# Patient Record
Sex: FEMALE | Race: OTHER | NOT HISPANIC OR LATINO | URBAN - METROPOLITAN AREA
[De-identification: names, ages, dates, MRNs, and addresses within clinical notes are randomized per-mention and may not be internally consistent; named-entity substitution may affect disease eponyms.]

---

## 2020-12-11 ENCOUNTER — INPATIENT (INPATIENT)
Facility: HOSPITAL | Age: 40
LOS: 0 days | Discharge: HOME | DRG: 833 | End: 2020-12-12
Attending: STUDENT IN AN ORGANIZED HEALTH CARE EDUCATION/TRAINING PROGRAM | Admitting: STUDENT IN AN ORGANIZED HEALTH CARE EDUCATION/TRAINING PROGRAM
Payer: MEDICAID

## 2020-12-11 ENCOUNTER — OUTPATIENT (OUTPATIENT)
Dept: OUTPATIENT SERVICES | Facility: HOSPITAL | Age: 40
LOS: 1 days | End: 2020-12-11
Payer: SELF-PAY

## 2020-12-11 VITALS
HEART RATE: 87 BPM | SYSTOLIC BLOOD PRESSURE: 124 MMHG | RESPIRATION RATE: 16 BRPM | WEIGHT: 164.91 LBS | TEMPERATURE: 98 F | DIASTOLIC BLOOD PRESSURE: 64 MMHG | OXYGEN SATURATION: 98 %

## 2020-12-11 DIAGNOSIS — F14.99 COCAINE USE, UNSPECIFIED WITH UNSPECIFIED COCAINE-INDUCED DISORDER: ICD-10-CM

## 2020-12-11 DIAGNOSIS — Z3A.27 27 WEEKS GESTATION OF PREGNANCY: ICD-10-CM

## 2020-12-11 DIAGNOSIS — Z59.0 HOMELESSNESS: ICD-10-CM

## 2020-12-11 DIAGNOSIS — R07.89 OTHER CHEST PAIN: ICD-10-CM

## 2020-12-11 DIAGNOSIS — O99.282 ENDOCRINE, NUTRITIONAL AND METABOLIC DISEASES COMPLICATING PREGNANCY, SECOND TRIMESTER: ICD-10-CM

## 2020-12-11 DIAGNOSIS — O99.322 DRUG USE COMPLICATING PREGNANCY, SECOND TRIMESTER: ICD-10-CM

## 2020-12-11 DIAGNOSIS — E88.09 OTHER DISORDERS OF PLASMA-PROTEIN METABOLISM, NOT ELSEWHERE CLASSIFIED: ICD-10-CM

## 2020-12-11 DIAGNOSIS — Z3A.00 WEEKS OF GESTATION OF PREGNANCY NOT SPECIFIED: ICD-10-CM

## 2020-12-11 DIAGNOSIS — O26.899 OTHER SPECIFIED PREGNANCY RELATED CONDITIONS, UNSPECIFIED TRIMESTER: ICD-10-CM

## 2020-12-11 DIAGNOSIS — O99.891 OTHER SPECIFIED DISEASES AND CONDITIONS COMPLICATING PREGNANCY: ICD-10-CM

## 2020-12-11 DIAGNOSIS — R07.9 CHEST PAIN, UNSPECIFIED: ICD-10-CM

## 2020-12-11 LAB
ALBUMIN SERPL ELPH-MCNC: 2.2 G/DL — LOW (ref 3.5–5)
ALP SERPL-CCNC: 109 U/L — SIGNIFICANT CHANGE UP (ref 40–120)
ALT FLD-CCNC: 36 U/L DA — SIGNIFICANT CHANGE UP (ref 10–60)
ANION GAP SERPL CALC-SCNC: 8 MMOL/L — SIGNIFICANT CHANGE UP (ref 5–17)
AST SERPL-CCNC: 31 U/L — SIGNIFICANT CHANGE UP (ref 10–40)
BASOPHILS # BLD AUTO: 0.04 K/UL — SIGNIFICANT CHANGE UP (ref 0–0.2)
BASOPHILS NFR BLD AUTO: 0.5 % — SIGNIFICANT CHANGE UP (ref 0–2)
BILIRUB SERPL-MCNC: 0.2 MG/DL — SIGNIFICANT CHANGE UP (ref 0.2–1.2)
BUN SERPL-MCNC: 14 MG/DL — SIGNIFICANT CHANGE UP (ref 7–18)
CALCIUM SERPL-MCNC: 8 MG/DL — LOW (ref 8.4–10.5)
CHLORIDE SERPL-SCNC: 108 MMOL/L — SIGNIFICANT CHANGE UP (ref 96–108)
CK SERPL-CCNC: 174 U/L — SIGNIFICANT CHANGE UP (ref 21–215)
CO2 SERPL-SCNC: 22 MMOL/L — SIGNIFICANT CHANGE UP (ref 22–31)
CREAT SERPL-MCNC: 0.7 MG/DL — SIGNIFICANT CHANGE UP (ref 0.5–1.3)
D DIMER BLD IA.RAPID-MCNC: 304 NG/ML DDU — HIGH
EOSINOPHIL # BLD AUTO: 0.17 K/UL — SIGNIFICANT CHANGE UP (ref 0–0.5)
EOSINOPHIL NFR BLD AUTO: 2.2 % — SIGNIFICANT CHANGE UP (ref 0–6)
ERYTHROCYTE [SEDIMENTATION RATE] IN BLOOD: 64 MM/HR — HIGH (ref 0–15)
ETHANOL SERPL-MCNC: <3 MG/DL — SIGNIFICANT CHANGE UP (ref 0–10)
GLUCOSE SERPL-MCNC: 118 MG/DL — HIGH (ref 70–99)
HCT VFR BLD CALC: 23.5 % — LOW (ref 34.5–45)
HGB BLD-MCNC: 7.6 G/DL — LOW (ref 11.5–15.5)
IMM GRANULOCYTES NFR BLD AUTO: 0.4 % — SIGNIFICANT CHANGE UP (ref 0–1.5)
LYMPHOCYTES # BLD AUTO: 1.44 K/UL — SIGNIFICANT CHANGE UP (ref 1–3.3)
LYMPHOCYTES # BLD AUTO: 18.5 % — SIGNIFICANT CHANGE UP (ref 13–44)
MCHC RBC-ENTMCNC: 26.3 PG — LOW (ref 27–34)
MCHC RBC-ENTMCNC: 32.3 GM/DL — SIGNIFICANT CHANGE UP (ref 32–36)
MCV RBC AUTO: 81.3 FL — SIGNIFICANT CHANGE UP (ref 80–100)
MONOCYTES # BLD AUTO: 0.51 K/UL — SIGNIFICANT CHANGE UP (ref 0–0.9)
MONOCYTES NFR BLD AUTO: 6.5 % — SIGNIFICANT CHANGE UP (ref 2–14)
NEUTROPHILS # BLD AUTO: 5.6 K/UL — SIGNIFICANT CHANGE UP (ref 1.8–7.4)
NEUTROPHILS NFR BLD AUTO: 71.9 % — SIGNIFICANT CHANGE UP (ref 43–77)
NRBC # BLD: 0 /100 WBCS — SIGNIFICANT CHANGE UP (ref 0–0)
PLATELET # BLD AUTO: 276 K/UL — SIGNIFICANT CHANGE UP (ref 150–400)
POTASSIUM SERPL-MCNC: 3.5 MMOL/L — SIGNIFICANT CHANGE UP (ref 3.5–5.3)
POTASSIUM SERPL-SCNC: 3.5 MMOL/L — SIGNIFICANT CHANGE UP (ref 3.5–5.3)
PROT SERPL-MCNC: 6 G/DL — SIGNIFICANT CHANGE UP (ref 6–8.3)
RBC # BLD: 2.89 M/UL — LOW (ref 3.8–5.2)
RBC # FLD: 14.4 % — SIGNIFICANT CHANGE UP (ref 10.3–14.5)
SODIUM SERPL-SCNC: 138 MMOL/L — SIGNIFICANT CHANGE UP (ref 135–145)
T4 AB SER-ACNC: 9.7 UG/DL — SIGNIFICANT CHANGE UP (ref 4.6–12)
TROPONIN I SERPL-MCNC: <0.015 NG/ML — SIGNIFICANT CHANGE UP (ref 0–0.04)
TSH SERPL-MCNC: 0.42 UU/ML — SIGNIFICANT CHANGE UP (ref 0.34–4.82)
WBC # BLD: 7.79 K/UL — SIGNIFICANT CHANGE UP (ref 3.8–10.5)
WBC # FLD AUTO: 7.79 K/UL — SIGNIFICANT CHANGE UP (ref 3.8–10.5)

## 2020-12-11 PROCEDURE — 59025 FETAL NON-STRESS TEST: CPT

## 2020-12-11 PROCEDURE — G0463: CPT

## 2020-12-11 PROCEDURE — 93010 ELECTROCARDIOGRAM REPORT: CPT | Mod: 76

## 2020-12-11 PROCEDURE — 99282 EMERGENCY DEPT VISIT SF MDM: CPT

## 2020-12-11 PROCEDURE — 76818 FETAL BIOPHYS PROFILE W/NST: CPT

## 2020-12-11 PROCEDURE — 76818 FETAL BIOPHYS PROFILE W/NST: CPT | Mod: 26

## 2020-12-11 PROCEDURE — 76815 OB US LIMITED FETUS(S): CPT

## 2020-12-11 PROCEDURE — 76815 OB US LIMITED FETUS(S): CPT | Mod: 26

## 2020-12-11 PROCEDURE — 99222 1ST HOSP IP/OBS MODERATE 55: CPT

## 2020-12-11 PROCEDURE — 99285 EMERGENCY DEPT VISIT HI MDM: CPT

## 2020-12-11 RX ORDER — SODIUM CHLORIDE 9 MG/ML
1000 INJECTION INTRAMUSCULAR; INTRAVENOUS; SUBCUTANEOUS
Refills: 0 | Status: DISCONTINUED | OUTPATIENT
Start: 2020-12-11 | End: 2020-12-11

## 2020-12-11 RX ORDER — ACETAMINOPHEN 500 MG
650 TABLET ORAL ONCE
Refills: 0 | Status: COMPLETED | OUTPATIENT
Start: 2020-12-11 | End: 2020-12-11

## 2020-12-11 RX ADMIN — SODIUM CHLORIDE 150 MILLILITER(S): 9 INJECTION INTRAMUSCULAR; INTRAVENOUS; SUBCUTANEOUS at 18:31

## 2020-12-11 RX ADMIN — Medication 650 MILLIGRAM(S): at 21:21

## 2020-12-11 RX ADMIN — Medication 650 MILLIGRAM(S): at 20:09

## 2020-12-11 SDOH — ECONOMIC STABILITY - HOUSING INSECURITY: HOMELESSNESS: Z59.0

## 2020-12-11 NOTE — CHART NOTE - NSCHARTNOTEFT_GEN_A_CORE
Verbal swk consult   Pt is a 40year old female who came to the ED  for abdominal pain. Hermila met with  Pt at bedside per her request but changed her mind about meeting with hermila upon hermila 's arrival. Pt appeared alert and orientedx3.  Physician  aware. No further swk intervention needed at this time.

## 2020-12-11 NOTE — ED PROVIDER NOTE - CLINICAL SUMMARY MEDICAL DECISION MAKING FREE TEXT BOX
pt with atypical CP, sob, given h/o pregnancy, will get d-dimer.  Explained to pt that will get CTA chest if d-dimer is abn., pt refuses, claims just want to have her blood test done.

## 2020-12-11 NOTE — ED ADULT NURSE REASSESSMENT NOTE - NS ED NURSE REASSESS COMMENT FT1
Pt is alert and oriented x3. Pt refused tele monitoring, covid swab. Dr. Pate notified. Dr. Pate at the bedside. No sign of acute distress noted. Safety precaution maintained.

## 2020-12-11 NOTE — CHART NOTE - NSCHARTNOTESELECT_GEN_ALL_CORE
[facility] [address]  [address] [facility] [address]  Dept: [phone]      EMTALA TRANSFER CONSENT    NAME Car Nguyễn 2011                              MRN [id number]    I have been informed of my rights regarding examination, treatment, and transfer   by Dr Wilson Friends, DO    Benefits: Specialized equipment and/or services available at the receiving facility (Include comment)________________________(Pediatric surgery)    Risks: Potential for delay in receiving treatment, Potential deterioration of medical condition, Loss of IV, Increased discomfort during transfer      Consent for Transfer:  I acknowledge that my medical condition has been evaluated and explained to me by the emergency department physician or other qualified medical person and/or my attending physician, who has recommended that I be transferred to the service of  Accepting Physician: Dr Jamia Weber at [address] [facility] Name, [address] : [address]  The above potential benefits of such transfer, the potential risks associated with such transfer, and the probable risks of not being transferred have been explained to me, and I fully understand them  The doctor has explained that, in my case, the benefits of transfer outweigh the risks  I agree to be transferred  I authorize the performance of emergency medical procedures and treatments upon me in both transit and upon arrival at the receiving facility  Additionally, I authorize the release of any and all medical records to the receiving facility and request they be transported with me, if possible  I understand that the safest mode of transportation during a medical emergency is an ambulance and that the Hospital advocates the use of this mode of transport   Risks of traveling to the receiving facility by car, including absence of medical control, life sustaining equipment, such as Event Note oxygen, and medical personnel has been explained to me and I fully understand them  (ELINA CORRECT BOX BELOW)  [  ]  I consent to the stated transfer and to be transported by ambulance/helicopter  [  ]  I consent to the stated transfer, but refuse transportation by ambulance and accept full responsibility for my transportation by car  I understand the risks of non-ambulance transfers and I exonerate the Hospital and its staff from any deterioration in my condition that results from this refusal     X___________________________________________    DATE  07/21/20  TIME________  Signature of patient or legally responsible individual signing on patient behalf           RELATIONSHIP TO PATIENT_________________________          Provider Certification    NAME Car Lee 2011                              MRN [id number]    A medical screening exam was performed on the above named patient  Based on the examination:    Condition Necessitating Transfer The encounter diagnosis was Acute appendicitis      Patient Condition: The patient has been stabilized such that within reasonable medical probability, no material deterioration of the patient condition or the condition of the unborn child(shruti) is likely to result from the transfer    Reason for Transfer: Level of Care needed not available at this facility    Transfer Requirements: [address]   · Space available and qualified personnel available for treatment as acknowledged by    · Agreed to accept transfer and to provide appropriate medical treatment as acknowledged by       Dr Anton Riggs  · Appropriate medical records of the examination and treatment of the patient are provided at the time of transfer   [address], [address] _______  · Transfer will be performed by qualified personnel from    and appropriate transfer equipment as required, including the use of necessary and appropriate life support measures  Provider Certification: I have examined the patient and explained the following risks and benefits of being transferred/refusing transfer to the patient/family:  General risk, such as traffic hazards, adverse weather conditions, rough terrain or turbulence, possible failure of equipment (including vehicle or aircraft), or consequences of actions of persons outside the control of the transport personnel, Unanticipated needs of medical equipment and personnel during transport, Risk of worsening condition, The possibility of a transport vehicle being unavailable      Based on these reasonable risks and benefits to the patient and/or the unborn child(shruti), and based upon the information available at the time of the patients examination, I certify that the medical benefits reasonably to be expected from the provision of appropriate medical treatments at another medical facility outweigh the increasing risks, if any, to the individuals medical condition, and in the case of labor to the unborn child, from effecting the transfer      X____________________________________________ DATE 07/21/20        TIME_______      ORIGINAL - SEND TO MEDICAL RECORDS   COPY - SEND WITH PATIENT DURING TRANSFER

## 2020-12-11 NOTE — ED PROVIDER NOTE - OBJECTIVE STATEMENT
40 y.o. female , pt's currently living in the shelter, LMP?, c/o constant focal MSCP for past 2 days, worse with deep inspiration, no fever, coughing, n/v, pt took nothing for pain, sharp pain 10/10, 40 y.o. female , pt's currently living in the shelter, LMP?, c/o constant focal MSCP for past 2 days, worse with deep inspiration, no fever, coughing, n/v, pt took nothing for pain, sharp pain 10/10,  Pt h/o pericarditis.  Pt was evaluated @ L&D before coming back down to ED

## 2020-12-11 NOTE — ED PROVIDER NOTE - PROGRESS NOTE DETAILS
pt's sleeping, in no distress, d-dimer sl elevated, pt refuses CTA, explained to pt about PE, the consequences of having PE, pt's Ox3, understands well, awaiting for ESR, will have pt sign out AMA. Pt now agrees to CTA. Pt now again refuses CTA, wants to talk to SW. ESR 64, given h/o pericarditis, will admit pt., Case d/w Dr. Montes.  pt also agrees to be admitted Pt refuses COVID testing, will have pt sign AMA.  Pt just wants to speak with the SW

## 2020-12-11 NOTE — ED ADULT NURSE NOTE - OBJECTIVE STATEMENT
Patient presents to ED with c/o chest pain x2days. Denies nausea/vomiting, with no difficulty breathing. Patient states she is 7 months pregnant.

## 2020-12-11 NOTE — ED ADULT NURSE NOTE - NSIMPLEMENTINTERV_GEN_ALL_ED
Implemented All Fall with Harm Risk Interventions:  [place] to call system. Call bell, personal items and telephone within reach. Instruct patient to call for assistance. Room bathroom lighting operational. Non-slip footwear when patient is off stretcher. Physically safe environment: no spills, clutter or unnecessary equipment. Stretcher in lowest position, wheels locked, appropriate side rails in place. Provide visual cue, wrist band, yellow gown, etc. Monitor gait and stability. Monitor for mental status changes and reorient to person, place, and time. Review medications for side effects contributing to fall risk. Reinforce activity limits and safety measures with patient and family. Provide visual clues: red socks.

## 2020-12-12 VITALS
RESPIRATION RATE: 17 BRPM | HEART RATE: 88 BPM | DIASTOLIC BLOOD PRESSURE: 73 MMHG | TEMPERATURE: 98 F | SYSTOLIC BLOOD PRESSURE: 114 MMHG | OXYGEN SATURATION: 100 %

## 2020-12-12 LAB
24R-OH-CALCIDIOL SERPL-MCNC: 24.3 NG/ML — LOW (ref 30–80)
A1C WITH ESTIMATED AVERAGE GLUCOSE RESULT: 5.5 % — SIGNIFICANT CHANGE UP (ref 4–5.6)
AMPHET UR-MCNC: NEGATIVE — SIGNIFICANT CHANGE UP
ANION GAP SERPL CALC-SCNC: 9 MMOL/L — SIGNIFICANT CHANGE UP (ref 5–17)
APPEARANCE UR: CLEAR — SIGNIFICANT CHANGE UP
APTT BLD: 25.5 SEC — LOW (ref 27.5–35.5)
BACTERIA # UR AUTO: ABNORMAL /HPF
BARBITURATES UR SCN-MCNC: NEGATIVE — SIGNIFICANT CHANGE UP
BENZODIAZ UR-MCNC: NEGATIVE — SIGNIFICANT CHANGE UP
BILIRUB UR-MCNC: NEGATIVE — SIGNIFICANT CHANGE UP
BLD GP AB SCN SERPL QL: SIGNIFICANT CHANGE UP
BUN SERPL-MCNC: 15 MG/DL — SIGNIFICANT CHANGE UP (ref 7–18)
CALCIUM SERPL-MCNC: 8 MG/DL — LOW (ref 8.4–10.5)
CHLORIDE SERPL-SCNC: 108 MMOL/L — SIGNIFICANT CHANGE UP (ref 96–108)
CHOLEST SERPL-MCNC: 173 MG/DL — SIGNIFICANT CHANGE UP
CO2 SERPL-SCNC: 21 MMOL/L — LOW (ref 22–31)
COCAINE METAB.OTHER UR-MCNC: POSITIVE
COLOR SPEC: YELLOW — SIGNIFICANT CHANGE UP
CREAT SERPL-MCNC: 0.53 MG/DL — SIGNIFICANT CHANGE UP (ref 0.5–1.3)
DIFF PNL FLD: ABNORMAL
EPI CELLS # UR: ABNORMAL /HPF
ESTIMATED AVERAGE GLUCOSE: 111 MG/DL — SIGNIFICANT CHANGE UP (ref 68–114)
FERRITIN SERPL-MCNC: 15 NG/ML — SIGNIFICANT CHANGE UP (ref 15–150)
FOLATE SERPL-MCNC: 13 NG/ML — SIGNIFICANT CHANGE UP
GLUCOSE SERPL-MCNC: 80 MG/DL — SIGNIFICANT CHANGE UP (ref 70–99)
GLUCOSE UR QL: NEGATIVE — SIGNIFICANT CHANGE UP
HCT VFR BLD CALC: 23.6 % — LOW (ref 34.5–45)
HDLC SERPL-MCNC: 81 MG/DL — SIGNIFICANT CHANGE UP
HGB BLD-MCNC: 7.5 G/DL — LOW (ref 11.5–15.5)
INR BLD: 1.07 RATIO — SIGNIFICANT CHANGE UP (ref 0.88–1.16)
IRON SATN MFR SERPL: 38 UG/DL — LOW (ref 40–150)
IRON SATN MFR SERPL: 8 % — LOW (ref 15–50)
KETONES UR-MCNC: NEGATIVE — SIGNIFICANT CHANGE UP
LEUKOCYTE ESTERASE UR-ACNC: ABNORMAL
LIPID PNL WITH DIRECT LDL SERPL: 62 MG/DL — SIGNIFICANT CHANGE UP
MAGNESIUM SERPL-MCNC: 1.7 MG/DL — SIGNIFICANT CHANGE UP (ref 1.6–2.6)
MCHC RBC-ENTMCNC: 26.1 PG — LOW (ref 27–34)
MCHC RBC-ENTMCNC: 31.8 GM/DL — LOW (ref 32–36)
MCV RBC AUTO: 82.2 FL — SIGNIFICANT CHANGE UP (ref 80–100)
METHADONE UR-MCNC: NEGATIVE — SIGNIFICANT CHANGE UP
NITRITE UR-MCNC: NEGATIVE — SIGNIFICANT CHANGE UP
NON HDL CHOLESTEROL: 92 MG/DL — SIGNIFICANT CHANGE UP
NRBC # BLD: 0 /100 WBCS — SIGNIFICANT CHANGE UP (ref 0–0)
NT-PROBNP SERPL-SCNC: 55 PG/ML — SIGNIFICANT CHANGE UP (ref 0–125)
OPIATES UR-MCNC: NEGATIVE — SIGNIFICANT CHANGE UP
PCP SPEC-MCNC: SIGNIFICANT CHANGE UP
PCP UR-MCNC: NEGATIVE — SIGNIFICANT CHANGE UP
PH UR: 6 — SIGNIFICANT CHANGE UP (ref 5–8)
PHOSPHATE SERPL-MCNC: 3.2 MG/DL — SIGNIFICANT CHANGE UP (ref 2.5–4.5)
PLATELET # BLD AUTO: 267 K/UL — SIGNIFICANT CHANGE UP (ref 150–400)
POTASSIUM SERPL-MCNC: 3.6 MMOL/L — SIGNIFICANT CHANGE UP (ref 3.5–5.3)
POTASSIUM SERPL-SCNC: 3.6 MMOL/L — SIGNIFICANT CHANGE UP (ref 3.5–5.3)
PROT UR-MCNC: 15
PROTHROM AB SERPL-ACNC: 12.7 SEC — SIGNIFICANT CHANGE UP (ref 10.6–13.6)
RBC # BLD: 2.87 M/UL — LOW (ref 3.8–5.2)
RBC # BLD: 2.87 M/UL — LOW (ref 3.8–5.2)
RBC # FLD: 14.6 % — HIGH (ref 10.3–14.5)
RBC CASTS # UR COMP ASSIST: SIGNIFICANT CHANGE UP /HPF (ref 0–2)
RETICS #: 49.1 K/UL — SIGNIFICANT CHANGE UP (ref 25–125)
RETICS/RBC NFR: 1.7 % — SIGNIFICANT CHANGE UP (ref 0.5–2.5)
SARS-COV-2 RNA SPEC QL NAA+PROBE: SIGNIFICANT CHANGE UP
SODIUM SERPL-SCNC: 138 MMOL/L — SIGNIFICANT CHANGE UP (ref 135–145)
SP GR SPEC: 1.02 — SIGNIFICANT CHANGE UP (ref 1.01–1.02)
T3 SERPL-MCNC: 158 NG/DL — SIGNIFICANT CHANGE UP (ref 80–200)
THC UR QL: NEGATIVE — SIGNIFICANT CHANGE UP
TIBC SERPL-MCNC: 453 UG/DL — HIGH (ref 250–450)
TRIGL SERPL-MCNC: 152 MG/DL — HIGH
TROPONIN I SERPL-MCNC: <0.015 NG/ML — SIGNIFICANT CHANGE UP (ref 0–0.04)
TSH SERPL-MCNC: 0.46 UU/ML — SIGNIFICANT CHANGE UP (ref 0.34–4.82)
UIBC SERPL-MCNC: 415 UG/DL — HIGH (ref 110–370)
UROBILINOGEN FLD QL: NEGATIVE — SIGNIFICANT CHANGE UP
VIT B12 SERPL-MCNC: 363 PG/ML — SIGNIFICANT CHANGE UP (ref 232–1245)
WBC # BLD: 6.91 K/UL — SIGNIFICANT CHANGE UP (ref 3.8–10.5)
WBC # FLD AUTO: 6.91 K/UL — SIGNIFICANT CHANGE UP (ref 3.8–10.5)
WBC UR QL: SIGNIFICANT CHANGE UP /HPF (ref 0–5)

## 2020-12-12 PROCEDURE — 83540 ASSAY OF IRON: CPT

## 2020-12-12 PROCEDURE — 80061 LIPID PANEL: CPT

## 2020-12-12 PROCEDURE — 86850 RBC ANTIBODY SCREEN: CPT

## 2020-12-12 PROCEDURE — 99239 HOSP IP/OBS DSCHRG MGMT >30: CPT | Mod: GC

## 2020-12-12 PROCEDURE — 82607 VITAMIN B-12: CPT

## 2020-12-12 PROCEDURE — 82746 ASSAY OF FOLIC ACID SERUM: CPT

## 2020-12-12 PROCEDURE — 84443 ASSAY THYROID STIM HORMONE: CPT

## 2020-12-12 PROCEDURE — 83880 ASSAY OF NATRIURETIC PEPTIDE: CPT

## 2020-12-12 PROCEDURE — 85379 FIBRIN DEGRADATION QUANT: CPT

## 2020-12-12 PROCEDURE — 80307 DRUG TEST PRSMV CHEM ANLYZR: CPT

## 2020-12-12 PROCEDURE — 86901 BLOOD TYPING SEROLOGIC RH(D): CPT

## 2020-12-12 PROCEDURE — 85027 COMPLETE CBC AUTOMATED: CPT

## 2020-12-12 PROCEDURE — 93306 TTE W/DOPPLER COMPLETE: CPT | Mod: 26

## 2020-12-12 PROCEDURE — 85730 THROMBOPLASTIN TIME PARTIAL: CPT

## 2020-12-12 PROCEDURE — 84484 ASSAY OF TROPONIN QUANT: CPT

## 2020-12-12 PROCEDURE — 83550 IRON BINDING TEST: CPT

## 2020-12-12 PROCEDURE — 36415 COLL VENOUS BLD VENIPUNCTURE: CPT

## 2020-12-12 PROCEDURE — 80053 COMPREHEN METABOLIC PANEL: CPT

## 2020-12-12 PROCEDURE — 86900 BLOOD TYPING SEROLOGIC ABO: CPT

## 2020-12-12 PROCEDURE — 84100 ASSAY OF PHOSPHORUS: CPT

## 2020-12-12 PROCEDURE — 83735 ASSAY OF MAGNESIUM: CPT

## 2020-12-12 PROCEDURE — 82728 ASSAY OF FERRITIN: CPT

## 2020-12-12 PROCEDURE — 85025 COMPLETE CBC W/AUTO DIFF WBC: CPT

## 2020-12-12 PROCEDURE — 93005 ELECTROCARDIOGRAM TRACING: CPT

## 2020-12-12 PROCEDURE — 99285 EMERGENCY DEPT VISIT HI MDM: CPT | Mod: 25

## 2020-12-12 PROCEDURE — 84436 ASSAY OF TOTAL THYROXINE: CPT

## 2020-12-12 PROCEDURE — 81001 URINALYSIS AUTO W/SCOPE: CPT

## 2020-12-12 PROCEDURE — 80048 BASIC METABOLIC PNL TOTAL CA: CPT

## 2020-12-12 PROCEDURE — 84480 ASSAY TRIIODOTHYRONINE (T3): CPT

## 2020-12-12 PROCEDURE — 82550 ASSAY OF CK (CPK): CPT

## 2020-12-12 PROCEDURE — 87635 SARS-COV-2 COVID-19 AMP PRB: CPT

## 2020-12-12 PROCEDURE — 82306 VITAMIN D 25 HYDROXY: CPT

## 2020-12-12 PROCEDURE — 85652 RBC SED RATE AUTOMATED: CPT

## 2020-12-12 PROCEDURE — 85045 AUTOMATED RETICULOCYTE COUNT: CPT

## 2020-12-12 PROCEDURE — 83036 HEMOGLOBIN GLYCOSYLATED A1C: CPT

## 2020-12-12 PROCEDURE — 93306 TTE W/DOPPLER COMPLETE: CPT

## 2020-12-12 PROCEDURE — 85610 PROTHROMBIN TIME: CPT

## 2020-12-12 RX ORDER — ONDANSETRON 8 MG/1
4 TABLET, FILM COATED ORAL EVERY 6 HOURS
Refills: 0 | Status: DISCONTINUED | OUTPATIENT
Start: 2020-12-12 | End: 2020-12-12

## 2020-12-12 RX ORDER — ACETAMINOPHEN 500 MG
650 TABLET ORAL EVERY 6 HOURS
Refills: 0 | Status: DISCONTINUED | OUTPATIENT
Start: 2020-12-12 | End: 2020-12-12

## 2020-12-12 NOTE — DISCHARGE NOTE PROVIDER - NSDCCPCAREPLAN_GEN_ALL_CORE_FT
PRINCIPAL DISCHARGE DIAGNOSIS  Diagnosis: Chest pain  Assessment and Plan of Treatment: Your echocardiagram was within normal limits.  Your toxicology results indicate you recently used cocaine.  It is important that for the health of you and your unborn child that you abstain from the use of recreational drugs.     Should you begin to experience chest pain again please seek immediate medical attention.

## 2020-12-12 NOTE — DISCHARGE NOTE NURSING/CASE MANAGEMENT/SOCIAL WORK - PATIENT PORTAL LINK FT
You can access the FollowMyHealth Patient Portal offered by [facility] by registering at the following website: [email or web address]. By joining [facility]’s FollowMyHealth portal, you will also be able to view your health information using other applications (apps) compatible with our system.

## 2020-12-12 NOTE — DISCHARGE NOTE PROVIDER - NSDCPNSUBOBJ_GEN_ALL_CORE
Patient is a 40y old  Female who presents with a chief complaint of chest pain    When seen and examined this morning in ER, at first patient did not respond. Later said she has recent h/o pericarditis for which she was treated with Naprosyn as per patient (despite of pregnancy?). Complains of chest pain, more upon palpation, somehow improved from yesterday. Denies any SOB, palpitation. Reports she is 8months pregnant. Reports she follows [facility] for Obgyn follow up. She lives in a shelter in [place], was visiting a friend here, says she went to school in this area. Says she does not have any family member.       INTERVAL HPI/OVERNIGHT EVENTS:  T(C): 36.4 (20 @ 11:01), Max: 36.6 (20 @ 04:02)  HR: 88 (20 @ 11:01) (85 - 94)  BP: 114/73 (20 @ 11:01) (99/63 - 124/64)  RR: 17 (20 @ 11:01) (16 - 17)  SpO2: 100% (20 @ 11:01) (96% - 100%)  Wt(kg): --  I&O's Summary      REVIEW OF SYSTEMS: denies fever, chills, SOB, palpitations, abdominal pain, nausea, vomiting, diarrhea, constipation, dizziness    MEDICATIONS  (STANDING):    MEDICATIONS  (PRN):  acetaminophen   Tablet .. 650 milliGRAM(s) Oral every 6 hours PRN Temp greater or equal to 38C (100.4F), Moderate Pain (4 - 6)  ondansetron Injectable 4 milliGRAM(s) IV Push every 6 hours PRN Nausea and/or Vomiting      PHYSICAL EXAM:  GENERAL: NAD, cachectic  NERVOUS SYSTEM:  Alert & Oriented X3, Good concentration; Motor Strength 5/5 B/L upper and lower extremities  CHEST/LUNG: Clear to auscultation bilaterally; No rales, rhonchi, wheezing, or rubs, mild tenderness on palpation of anterior chest wall  HEART: Regular rate and rhythm; No murmurs, rubs, or gallops  ABDOMEN: Soft, Nontender, pregnant abd; Bowel sounds present  EXTREMITIES:  2+ Peripheral Pulses, No clubbing, cyanosis, or edema  SKIN: No rashes or lesions    LABS:                        7.5    6.91  )-----------( 267      ( 12 Dec 2020 06:19 )             23.6     138  |  108  |  15  ----------------------------<  80  3.6   |  21<L>  |  0.53    Ca    8.0<L>      12 Dec 2020 06:19  Phos  3.2     12-  Mg     1.7     -    TPro  6.0  /  Alb  2.2<L>  /  TBili  0.2  /  DBili  x   /  AST  31  /  ALT  36  /  AlkPhos  109  12-11    PT/INR - ( 12 Dec 2020 06:19 )   PT: 12.7 sec;   INR: 1.07 ratio         PTT - ( 12 Dec 2020 06:19 )  PTT:25.5 sec  Urinalysis Basic - ( 12 Dec 2020 11:17 )    Color: Yellow / Appearance: Clear / S.020 / pH: x  Gluc: x / Ketone: Negative  / Bili: Negative / Urobili: Negative   Blood: x / Protein: 15 / Nitrite: Negative   Leuk Esterase: Trace / RBC: 0-2 /HPF / WBC 0-2 /HPF   Sq Epi: x / Non Sq Epi: Occasional /HPF / Bacteria: Few /HPF    A/P:  Atypical chest pain, Cocaine induced vs due to recent pericarditis   ZORAIDA  Hypoalbuminemia   Substance abuse   pregnant status     Plan:   Normal EKG, 2 tropx2 neg   TTE normal as per Dr Hilario   patient counselled about ante  follow up  Patient left ER before shelter was arranged for her, has very poor nutritional status but could not send supplements  She is AAOx3, knows risks and benefits of leaving without having good follow up

## 2020-12-12 NOTE — CHART NOTE - NSCHARTNOTEFT_GEN_A_CORE
Nurse in [facility] notified PA     Pt is c/o leakage of fluid.     Pt evaluated at bedside in [facility]. Pt admits to +fetal movement, denies contraction pain or vaginal bleeding. Pt states she just now felt like she was leaking. Pt denies any other complaints at this time     Vital Signs Last 24 Hrs  T(C): 36.5 (12 Dec 2020 07:15), Max: 36.6 (12 Dec 2020 04:02)  T(F): 97.7 (12 Dec 2020 07:15), Max: 97.9 (12 Dec 2020 04:02)  HR: 89 (12 Dec 2020 07:15) (85 - 115)  BP: 107/65 (12 Dec 2020 07:15) (99/63 - 124/64)  BP(mean): --  RR: 17 (12 Dec 2020 07:15) (16 - 19)  SpO2: 99% (12 Dec 2020 07:15) (96% - 100%)    PE  Gen: NAD, appears drowsy   Abd: soft, gravid, nontender, no palpable contractions felt; no rebound tenderness of guarding   SSE: pt refused speculum exam at this time   SVE: closed/long/posterior cervix; no blood or fluid seen       A/p: 39 y/o female  SIUP @ 27w2d, no evidence of rupture of membranes, BPP 8/8 on 2020   reassuring  testing done yesterday in L&D triage   -Pt is OB cleared   -re-consult prn   -Case d/w Dr. St, attending on call Nurse in [facility] notified PA     Pt is c/o leakage of fluid.     Pt evaluated at bedside in [facility]. Pt admits to +fetal movement, denies contraction pain or vaginal bleeding. Pt states she just now felt like she was leaking. Pt denies any other complaints at this time     Vital Signs Last 24 Hrs  T(C): 36.5 (12 Dec 2020 07:15), Max: 36.6 (12 Dec 2020 04:02)  T(F): 97.7 (12 Dec 2020 07:15), Max: 97.9 (12 Dec 2020 04:02)  HR: 89 (12 Dec 2020 07:15) (85 - 115)  BP: 107/65 (12 Dec 2020 07:15) (99/63 - 124/64)  BP(mean): --  RR: 17 (12 Dec 2020 07:15) (16 - 19)  SpO2: 99% (12 Dec 2020 07:15) (96% - 100%)    PE  Gen: NAD, appears drowsy   Abd: soft, gravid, nontender, no palpable contractions felt; no rebound tenderness of guarding   SSE: pt refused speculum exam at this time   SVE: closed/long/posterior cervix; no blood or fluid seen       A/p: 41 y/o female  SIUP @ 27w2d admitted to medicine for chest pain, no evidence of rupture of membranes, BPP 8/8 on 2020  reassuring  testing done yesterday in L&D triage   -continue management per medicine   -Pt is OB cleared   -re-consult OBGYN service prn   -Case d/w Dr. St, attending on call

## 2020-12-12 NOTE — DISCHARGE NOTE PROVIDER - HOSPITAL COURSE
39 y/o female  single intrauterine pregnancy @ 27w2d admitted to medicine for chest pain.     OB consulted, no evidence of membrane rupture,  testing done 12/10 in L&D triage.  Pt is OB cleared.    Cardiology consulted.  Echo WNL.    Toxicology resulted cocaine +    SW consulted for placement and notification to CPS.  Patient refuses to wait.  She is competent to make her own decisions.  She states that she has a place in a shelter in the [place], she was in [place] visiting a friend.      Pt consulted on drug use during pregnancy as well as her anemia.    Patient refused to wait for discharge instructions or scripts for iron tablets, prenatal vitamins and stool softener.  Patient was unable to provide pharmacy for scripts to be sent electronically.

## 2020-12-18 LAB — DRUG SCREEN, SERUM: ABNORMAL

## 2021-03-13 ENCOUNTER — EMERGENCY (EMERGENCY)
Facility: HOSPITAL | Age: 41
LOS: 1 days | Discharge: HOME | End: 2021-03-13
Attending: STUDENT IN AN ORGANIZED HEALTH CARE EDUCATION/TRAINING PROGRAM
Payer: SELF-PAY

## 2021-03-13 VITALS
RESPIRATION RATE: 18 BRPM | WEIGHT: 119.93 LBS | HEIGHT: 64 IN | DIASTOLIC BLOOD PRESSURE: 98 MMHG | HEART RATE: 86 BPM | OXYGEN SATURATION: 96 % | SYSTOLIC BLOOD PRESSURE: 146 MMHG | TEMPERATURE: 98 F

## 2021-03-13 VITALS
DIASTOLIC BLOOD PRESSURE: 57 MMHG | SYSTOLIC BLOOD PRESSURE: 95 MMHG | TEMPERATURE: 100 F | HEART RATE: 110 BPM | RESPIRATION RATE: 18 BRPM | OXYGEN SATURATION: 98 %

## 2021-03-13 LAB
ACANTHOCYTES BLD QL SMEAR: SLIGHT — SIGNIFICANT CHANGE UP
ALBUMIN SERPL ELPH-MCNC: 2.3 G/DL — LOW (ref 3.5–5)
ALP SERPL-CCNC: 641 U/L — HIGH (ref 40–120)
ALT FLD-CCNC: 51 U/L DA — SIGNIFICANT CHANGE UP (ref 10–60)
ANION GAP SERPL CALC-SCNC: 8 MMOL/L — SIGNIFICANT CHANGE UP (ref 5–17)
ANISOCYTOSIS BLD QL: SLIGHT — SIGNIFICANT CHANGE UP
APAP SERPL-MCNC: <2 UG/ML — LOW (ref 10–30)
APTT BLD: 30.4 SEC — SIGNIFICANT CHANGE UP (ref 27.5–35.5)
AST SERPL-CCNC: 63 U/L — HIGH (ref 10–40)
BASOPHILS # BLD AUTO: 0.05 K/UL — SIGNIFICANT CHANGE UP (ref 0–0.2)
BASOPHILS NFR BLD AUTO: 0.4 % — SIGNIFICANT CHANGE UP (ref 0–2)
BILIRUB SERPL-MCNC: 0.3 MG/DL — SIGNIFICANT CHANGE UP (ref 0.2–1.2)
BUN SERPL-MCNC: 7 MG/DL — SIGNIFICANT CHANGE UP (ref 7–18)
CALCIUM SERPL-MCNC: 8.6 MG/DL — SIGNIFICANT CHANGE UP (ref 8.4–10.5)
CHLORIDE SERPL-SCNC: 104 MMOL/L — SIGNIFICANT CHANGE UP (ref 96–108)
CO2 SERPL-SCNC: 24 MMOL/L — SIGNIFICANT CHANGE UP (ref 22–31)
CREAT SERPL-MCNC: 0.39 MG/DL — LOW (ref 0.5–1.3)
D DIMER BLD IA.RAPID-MCNC: 405 NG/ML DDU — HIGH
ELLIPTOCYTES BLD QL SMEAR: SLIGHT — SIGNIFICANT CHANGE UP
EOSINOPHIL # BLD AUTO: 0.03 K/UL — SIGNIFICANT CHANGE UP (ref 0–0.5)
EOSINOPHIL NFR BLD AUTO: 0.2 % — SIGNIFICANT CHANGE UP (ref 0–6)
ETHANOL SERPL-MCNC: <3 MG/DL — SIGNIFICANT CHANGE UP (ref 0–10)
GLUCOSE SERPL-MCNC: 85 MG/DL — SIGNIFICANT CHANGE UP (ref 70–99)
HCG SERPL-ACNC: 113 MIU/ML — HIGH
HCT VFR BLD CALC: 29.3 % — LOW (ref 34.5–45)
HGB BLD-MCNC: 8.8 G/DL — LOW (ref 11.5–15.5)
HYPOCHROMIA BLD QL: SLIGHT — SIGNIFICANT CHANGE UP
IMM GRANULOCYTES NFR BLD AUTO: 0.7 % — SIGNIFICANT CHANGE UP (ref 0–1.5)
INR BLD: 1.31 RATIO — HIGH (ref 0.88–1.16)
LIDOCAIN IGE QN: 240 U/L — SIGNIFICANT CHANGE UP (ref 73–393)
LYMPHOCYTES # BLD AUTO: 1.59 K/UL — SIGNIFICANT CHANGE UP (ref 1–3.3)
LYMPHOCYTES # BLD AUTO: 11.8 % — LOW (ref 13–44)
MACROCYTES BLD QL: SLIGHT — SIGNIFICANT CHANGE UP
MAGNESIUM SERPL-MCNC: 1.8 MG/DL — SIGNIFICANT CHANGE UP (ref 1.6–2.6)
MANUAL SMEAR VERIFICATION: SIGNIFICANT CHANGE UP
MCHC RBC-ENTMCNC: 21.9 PG — LOW (ref 27–34)
MCHC RBC-ENTMCNC: 30 GM/DL — LOW (ref 32–36)
MCV RBC AUTO: 73.1 FL — LOW (ref 80–100)
MICROCYTES BLD QL: SLIGHT — SIGNIFICANT CHANGE UP
MONOCYTES # BLD AUTO: 1.1 K/UL — HIGH (ref 0–0.9)
MONOCYTES NFR BLD AUTO: 8.2 % — SIGNIFICANT CHANGE UP (ref 2–14)
NEUTROPHILS # BLD AUTO: 10.62 K/UL — HIGH (ref 1.8–7.4)
NEUTROPHILS NFR BLD AUTO: 78.7 % — HIGH (ref 43–77)
NRBC # BLD: 0 /100 WBCS — SIGNIFICANT CHANGE UP (ref 0–0)
NT-PROBNP SERPL-SCNC: 129 PG/ML — HIGH (ref 0–125)
PLAT MORPH BLD: NORMAL — SIGNIFICANT CHANGE UP
PLATELET # BLD AUTO: 569 K/UL — HIGH (ref 150–400)
POIKILOCYTOSIS BLD QL AUTO: SLIGHT — SIGNIFICANT CHANGE UP
POLYCHROMASIA BLD QL SMEAR: SLIGHT — SIGNIFICANT CHANGE UP
POTASSIUM SERPL-MCNC: 4.6 MMOL/L — SIGNIFICANT CHANGE UP (ref 3.5–5.3)
POTASSIUM SERPL-SCNC: 4.6 MMOL/L — SIGNIFICANT CHANGE UP (ref 3.5–5.3)
PROT SERPL-MCNC: 6.9 G/DL — SIGNIFICANT CHANGE UP (ref 6–8.3)
PROTHROM AB SERPL-ACNC: 15.4 SEC — HIGH (ref 10.6–13.6)
RBC # BLD: 4.01 M/UL — SIGNIFICANT CHANGE UP (ref 3.8–5.2)
RBC # FLD: 18 % — HIGH (ref 10.3–14.5)
RBC BLD AUTO: ABNORMAL
SALICYLATES SERPL-MCNC: <1.7 MG/DL — LOW (ref 2.8–20)
SODIUM SERPL-SCNC: 136 MMOL/L — SIGNIFICANT CHANGE UP (ref 135–145)
SPHEROCYTES BLD QL SMEAR: SLIGHT — SIGNIFICANT CHANGE UP
TARGETS BLD QL SMEAR: SLIGHT — SIGNIFICANT CHANGE UP
TROPONIN I SERPL-MCNC: <0.015 NG/ML — SIGNIFICANT CHANGE UP (ref 0–0.04)
WBC # BLD: 13.49 K/UL — HIGH (ref 3.8–10.5)
WBC # FLD AUTO: 13.49 K/UL — HIGH (ref 3.8–10.5)

## 2021-03-13 PROCEDURE — 71045 X-RAY EXAM CHEST 1 VIEW: CPT | Mod: 26

## 2021-03-13 PROCEDURE — 99285 EMERGENCY DEPT VISIT HI MDM: CPT

## 2021-03-13 RX ORDER — KETOROLAC TROMETHAMINE 30 MG/ML
15 SYRINGE (ML) INJECTION ONCE
Refills: 0 | Status: DISCONTINUED | OUTPATIENT
Start: 2021-03-13 | End: 2021-03-13

## 2021-03-13 RX ADMIN — Medication 15 MILLIGRAM(S): at 19:03

## 2021-03-13 NOTE — ED PROVIDER NOTE - PHYSICAL EXAMINATION
General: well appearing female, no acute distress   HEENT: normocephalic, atraumatic   Respiratory: normal work of breathing, lungs clear to auscultation bilaterally   Cardiac: regular rate and rhythm   Abdomen: soft, non-tender, no guarding or rebound   MSK: no swelling or tenderness of lower extremities, moving all extremities spontaneously   Skin: warm, dry   Neuro: A&Ox3  Psych: agitated

## 2021-03-13 NOTE — ED ADULT NURSE REASSESSMENT NOTE - CONDITION
pt is stable, no respiratory distress, sleeping , but responsive to voice , refused a/c iv access, Dr Cisneros notified/unchanged

## 2021-03-13 NOTE — ED ADULT NURSE NOTE - ED CARDIAC CAPILLARY REFILL
Problem: Patient Care Overview  Goal: Plan of Care Review  8/2/2020 0440 by Eugenio iNcole RN  Outcome: Ongoing (interventions implemented as appropriate)  Flowsheets (Taken 8/2/2020 0440)  Progress: no change  Outcome Summary: VSS. Slept well majority of shift. Pain controlled with PRN meds. Will continue to monitor.      2 seconds or less

## 2021-03-13 NOTE — ED ADULT NURSE NOTE - OBJECTIVE STATEMENT
As per pt, c/o sharp CP since this morning worsening this afternoon. Pt denies h/a, SOB, f/c, n/v/d, or cough.

## 2021-03-13 NOTE — ED PROVIDER NOTE - OBJECTIVE STATEMENT
40F presenting with chest pain. patient reports she was [place] the air port when she began to have central chest pain. pain does not radiate. pain not worse with breathing or exertion. no similar symptoms in the past. reports history of pericarditis for months but has not seen a cardiologist. reports she recently had a daughter that was killed. requesting to speak to psychiatry but will give no other details.

## 2021-03-13 NOTE — ED PROVIDER NOTE - PATIENT PORTAL LINK FT
You can access the FollowMyHealth Patient Portal offered by [facility] by registering at the following website: [email or web address]. By joining [facility]’s FollowMyHealth portal, you will also be able to view your health information using other applications (apps) compatible with our system.

## 2021-03-13 NOTE — ED ADULT TRIAGE NOTE - CHIEF COMPLAINT QUOTE
as per ems pt was picked up from A , pt is c/o chest pain , says " I have pericarditis " I wants a EKG , pt also states " I have a tumor in my stomach and I have pain "

## 2021-03-13 NOTE — ED PROVIDER NOTE - CLINICAL SUMMARY MEDICAL DECISION MAKING FREE TEXT BOX
40F presenting with chest pain. patient unknown to this clinician but well known to other ED staff. patient has had pregnancies in the past and the children have been taken by [facility]. patient denies SI/HI or hearing voices. informed patient that she must give me more details before she can speak with psychiatry but the patient refuses. no pericardial effusion on beside US. will get CTA chest to r/o PE as d-dimer elevated. 40F presenting with chest pain. patient unknown to this clinician but well known to other ED staff. patient has had pregnancies in the past and the children have been taken by [facility]. patient denies SI/HI or hearing voices. informed patient that she must give me more details before she can speak with psychiatry but the patient refuses. no pericardial effusion on beside US. will get CTA chest to r/o PE as d-dimer elevated.    patient can be found under the name Anali Galvan. patient recently out AMA from  and discharge note from  shows patient with h/o: undomiciled (from a shelter), AAOX3 with PMHx of pericarditis, recent asymptomatic Covid PCR+, Schizophrenia (not on medications), normal vaginal delivery 3 weeks ago (baby in foster care), with recently diagnosed liver and lung masses. patient denies any medical history at this time.

## 2021-03-13 NOTE — ED ADULT NURSE NOTE - CHPI ED NUR SYMPTOMS NEG
no back pain/no chills/no congestion/no dizziness/no fever/no nausea/no shortness of breath/no syncope/no vomiting

## 2021-03-13 NOTE — ED PROVIDER NOTE - PROGRESS NOTE DETAILS
patient's hcg is chronically elevated to 113. was elevated to 113 on 3/5 at [facility]. 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 ob consulted at that time and patient unlikely to be pregnant. will signoff on CTA chest. Hay Ernandez CTA chest  - No evidence of pulmonary embolism followed to the segmental pulmonary arterial branches.  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 There is obstruction of the right lower lobe bronchus with right lower lobe atelectasis.  Heterogeneous enlargement of the liver suspicious for metastatic disease.    pt also registered in past as Farzaneh Galvan ([id number]).  CT's unchanged.  pt with known metastatic disease.  Pt given copy of all results.  Will dc

## 2021-03-14 PROCEDURE — 71275 CT ANGIOGRAPHY CHEST: CPT | Mod: 26,MG

## 2021-03-14 PROCEDURE — G1004: CPT

## 2021-03-14 RX ADMIN — Medication 15 MILLIGRAM(S): at 03:45

## 2022-01-06 NOTE — DISCHARGE NOTE PROVIDER - NSDCFUSCHEDAPPT_GEN_ALL_CORE_FT
[facility] Daily Progress Note    SUBJECTIVE     Subjective: Patient reports feeling ok today, tired. Currently oxygenating on Optiflow 70% FiO2 R 60 L. She reports having pain at the chest tube site at times but is tolerable with pain medication    OBJECTIVE     PHYSICAL EXAMINATION     height is 5' 7\" (1.702 m) and weight is 78.6 kg (173 lb 4.5 oz). Her oral temperature is 97.3 °F (36.3 °C). Her blood pressure is 107/69 and her pulse is 119 (abnormal). Her respiration is 20 and oxygen saturation is 94%.     Physical Exam  Vitals and nursing note reviewed.   Constitutional:       Appearance: Normal appearance. She is obese. She is ill-appearing.   Cardiovascular:      Rate and Rhythm: Regular rhythm. Tachycardia present.      Pulses: Normal pulses.      Heart sounds: Normal heart sounds.   Pulmonary:      Breath sounds: Examination of the right-lower field reveals decreased breath sounds. Examination of the left-lower field reveals decreased breath sounds. Decreased breath sounds present.      Comments:  Oxygenating on Optiflow 70% FiO2 at 60 L.     Chest:      Comments: Chest tube x1 in place  Musculoskeletal:      Cervical back: Neck supple.      Right lower leg: No edema.      Left lower leg: No edema.   Skin:     General: Skin is warm and dry.      Capillary Refill: Capillary refill takes less than 2 seconds.   Neurological:      General: No focal deficit present.      Mental Status: She is alert and oriented to person, place, and time.   Psychiatric:         Mood and Affect: Mood normal.         Behavior: Behavior normal.       REVIEW OF LABORATORY DATA  I have reviewed the following:    Recent Results (from the past 24 hour(s))   GLUCOSE, BEDSIDE - POINT OF CARE    Collection Time: 01/05/22  6:16 PM   Result Value Ref Range    GLUCOSE, BEDSIDE - POINT OF CARE 235 (H) 70 - 99 mg/dL   GLUCOSE, BEDSIDE - POINT OF CARE    Collection Time: 01/05/22  8:35 PM   Result Value Ref Range    GLUCOSE, BEDSIDE - POINT OF CARE  255 (H) 70 - 99 mg/dL   Basic Metabolic Panel    Collection Time: 01/06/22  5:15 AM   Result Value Ref Range    Fasting Status      Sodium 137 135 - 145 mmol/L    Potassium 3.4 3.4 - 5.1 mmol/L    Chloride 101 98 - 107 mmol/L    Carbon Dioxide 29 21 - 32 mmol/L    Anion Gap 10 10 - 20 mmol/L    Glucose 82 70 - 99 mg/dL    BUN 5 (L) 6 - 20 mg/dL    Creatinine 0.34 (L) 0.51 - 0.95 mg/dL    Glomerular Filtration Rate >90 >=60    BUN/ Creatinine Ratio 15 7 - 25    Calcium 9.3 8.4 - 10.2 mg/dL   Lavender Top    Collection Time: 01/06/22  5:15 AM   Result Value Ref Range    Extra Tube Hold for Add Ons    GLUCOSE, BEDSIDE - POINT OF CARE    Collection Time: 01/06/22  7:10 AM   Result Value Ref Range    GLUCOSE, BEDSIDE - POINT OF CARE 83 70 - 99 mg/dL   GLUCOSE, BEDSIDE - POINT OF CARE    Collection Time: 01/06/22 12:39 PM   Result Value Ref Range    GLUCOSE, BEDSIDE - POINT OF CARE 98 70 - 99 mg/dL       XR CHEST PA OR AP 1 VIEW   Final Result   1.    Left chest tube in place with tiny left apical pneumothorax improved   from prior study.     2.    Bilateral pulmonary infiltrates without significant change.      Electronically Signed by: DARYA TENORIO M.D.    Signed on: 1/6/2022 8:37 AM          XR CHEST PA OR AP 1 VIEW   Final Result   1.  Interval removal of more inferior left chest tube with more superior   chest tube in stable position.  Stable small left pneumothorax.   2.  Redemonstration of extensive, diffuse bilateral pulmonary infiltrates.      Electronically Signed by: YESENIA JOSHUA M.D.    Signed on: 1/4/2022 8:28 AM          XR CHEST PA OR AP 1 VIEW   Final Result   1.    Size decreased left pneumothorax.    2.   Redemonstrated bilateral opacities not significantly changed.      Electronically Signed by: DINORA SCHREIBER D.O.    Signed on: 1/3/2022 12:36 PM          XR CHEST PA OR AP 1 VIEW   Final Result   1.    No significant change to the bilateral pulmonary opacities seen with   Covid 19 pneumonia.    2.     Small left pneumothorax is increased in size.      Electronically Signed by: DINORA SCHREIBER D.O.    Signed on: 1/3/2022 7:46 AM          XR CHEST PA OR AP 1 VIEW   Final Result   1.  Redemonstrated diffuse bilateral pulmonary opacities.   2.  Small left pneumothorax is slightly increased from previous with   redemonstrated left chest tubes.      **The absence of findings should not deter follow-up or further evaluation   of concerning clinical findings.      FOR PHYSICIAN USE ONLY: Please note that this report was generated using   voice recognition software.  If you require clarification or feel that   there is an error in this report, please contact me.      Electronically Signed by: TANK DRAPER M.D.    Signed on: 1/2/2022 10:15 AM          XR CHEST PA OR AP 1 VIEW   Final Result   Interval placement of left-sided chest tube with near complete   interval resolution of previously noted pneumothorax.      Electronically Signed by: GAYATRI SUAREZ M.D.    Signed on: 1/1/2022 5:21 PM          XR CHEST PA OR AP 1 VIEW   Final Result   Interval enlargement of left-sided pneumothorax and interval   increased opacity at left lower lung zone.      Electronically Signed by: GAYATRI SUAREZ M.D.    Signed on: 1/1/2022 3:56 PM          XR CHEST PA OR AP 1 VIEW   Final Result   Interval decreased size of left-sided pneumothorax.      Electronically Signed by: GAYATRI SUAREZ M.D.    Signed on: 1/1/2022 7:18 AM          XR CHEST PA OR AP 1 VIEW   Final Result   1.  Stable positioning of left chest tube with increased left pneumothorax.   2.  Persistent bilateral pulmonary infiltrates.      Electronically Signed by: GORDON CISSE M.D.    Signed on: 12/31/2021 10:48 AM          XR CHEST PA OR AP 1 VIEW   Final Result   1.  Interval decreased pulmonary opacities.   2.  Interval resolution of previously noted small left apical pneumothorax.      Electronically Signed by: GAYATRI SUAREZ M.D.    Signed on: 12/30/2021 7:59 AM           XR CHEST PA OR AP 1 VIEW   Final Result   1.   Status post placement of left-sided chest tube with near complete   reexpansion of left pneumothorax.      Electronically Signed by: SHEN DOLL M.D.    Signed on: 12/29/2021 10:13 AM          XR CHEST PA OR AP 1 VIEW   Final Result   1.   Interval recurrence of left pneumothorax after chest tube removal.    Dr. Valentine notified via perfect serve.   2.   Similar bilateral diffusely increased interstitial opacity.      Electronically Signed by: SHEN DOLL M.D.    Signed on: 12/29/2021 8:28 AM          XR CHEST PA OR AP 1 VIEW   Final Result   1.    Status post pulled back of left-sided chest tube with reexpansion of   previously seen small left pneumothorax.   2.   Mild diffusely increased interstitial opacity similar to prior.      Electronically Signed by: SHEN DOLL M.D.    Signed on: 12/28/2021 9:12 AM          XR CHEST PA OR AP 1 VIEW   Final Result   1.  Diffuse bilateral airspace and interstitial pneumonia.   2.  Slight decrease volume of the left pneumothorax.      Electronically Signed by: AURELIO GUSMAN M.D.    Signed on: 12/26/2021 11:48 AM          XR CHEST PA OR AP 1 VIEW   Final Result      1.  Interval replacement of the left chest tube with decreasing size of the   left pneumothorax.   2.  Unchanged bilateral airspace opacities.         Electronically Signed by: GLENN SOMMER M.D.    Signed on: 12/26/2021 6:24 AM          XR CHEST PA OR AP 1 VIEW   Final Result      1.  Interval development of a moderately sized left pneumothorax with   displacement of the left chest tube into the lateral chest wall   subcutaneous soft tissues.   2.  Similar diffuse airspace opacities throughout both lungs.         Electronically Signed by: GLENN SOMMER M.D.    Signed on: 12/26/2021 6:22 AM          XR CHEST PA OR AP 1 VIEW   Final Result   1.  Stable diffuse bilateral pulmonary infiltrates.      Electronically Signed by: ERIK ALFRED  M.D.    Signed on: 12/25/2021 7:32 AM          XR CHEST PA OR AP 1 VIEW   Final Result    No significant interval change.      Electronically Signed by: YESENIA JOSHUA M.D.    Signed on: 12/23/2021 9:48 AM          XR CHEST PA OR AP 1 VIEW   Final Result   1.    Bilateral infiltrates show no significant change.      Electronically Signed by: DARYA TENORIO M.D.    Signed on: 12/22/2021 8:11 AM          XR CHEST PA OR AP 1 VIEW   Final Result   1.  No change from previous study.  Diffuse airspace parenchymal opacities   consistent with pneumonia.      Electronically Signed by: AURELIO GUSMAN M.D.    Signed on: 12/21/2021 9:02 AM          XR CHEST PA OR AP 1 VIEW   Final Result      1.  Stable positioning of the left basilar chest tube.  No pneumothorax.   2.  No significant change in diffuse interstitial airspace opacities.         Electronically Signed by: GLENN SOMMER M.D.    Signed on: 12/20/2021 7:51 AM          XR CHEST PA OR AP 1 VIEW   Final Result   1.  Interval removal of right chest tube.  No evidence of pneumothorax.   2.  Stable diffuse bilateral pulmonary infiltrates.      Electronically Signed by: YESENIA JOSHUA M.D.    Signed on: 12/18/2021 10:24 AM          XR CHEST PA OR AP 1 VIEW   Final Result   1.    No significant change to the bilateral pulmonary opacities.      Electronically Signed by: DINORA SCHREIBER D.O.    Signed on: 12/17/2021 8:38 AM          XR CHEST PA OR AP 1 VIEW   Final Result   FINDINGS AND IMPRESSION:   Frontal view of the chest was obtained.       Bilateral chest tubes are again visualized.  There has been retraction of   the left chest tube with only the tip projecting over the pleural space.    Clinical correlation is advised.  No evidence of pneumothorax.      The cardiomediastinal silhouette is stable in appearance.        Redemonstration of diffuse bilateral pulmonary infiltrates.  No large   volume pleural effusion.        Electronically Signed by: YESENIA JOSHUA M.D.    Signed on:  12/16/2021 8:57 AM          XR CHEST PA OR AP 1 VIEW   Final Result   FINDINGS/IMPRESSION:        Stable diffuse bilateral pulmonary opacities.      Stable bilateral chest tubes.  No pneumothorax.  No evolving pleural   effusion.      Stable cardiac size.      Electronically Signed by: BETITO VU M.D.    Signed on: 12/15/2021 11:30 AM          XR CHEST PA OR AP 1 VIEW   Final Result   1.  Stable positioning of bilateral chest tubes with trace bilateral apical   pneumothoraces.   2.  Stable bilateral infiltrates.      Electronically Signed by: GORDON CISSE M.D.    Signed on: 12/14/2021 7:57 AM          XR CHEST PA OR AP 1 VIEW   Final Result   1.   Bilateral chest tubes with very minimal residual apical   pneumothoraces.   2.   Similar minimal lower lung interstitial opacities and basilar   atelectasis.      Electronically Signed by: SHEN DOLL M.D.    Signed on: 12/13/2021 8:00 AM          XR CHEST PA OR AP 1 VIEW   Final Result   1. Tiny bilateral apical pneumothoraces. Reduction in the pneumothorax on   the left compared the prior exam.   2. Bilateral chest tubes present.    3. Bilateral infiltrates unchanged the prior exam.      Electronically Signed by: OBINNA NARAYANAN JR, M.D.    Signed on: 12/12/2021 7:36 AM          CT CHEST WO CONTRAST   Final Result       Tiny residual pneumothoraces associated with the indwelling bilateral   chest tubes of the upper lobes.  No new or large pneumothorax has   developed.        New pericardial effusion.       Extensive bilateral groundglass opacities which are involving the entire   lungs, bilaterally on the current study which is progressed from the prior   study.  Bibasilar lung consolidation or atelectasis similar to the prior   exam.      Electronically Signed by: OBINNA NARAYANAN JR, M.D.    Signed on: 12/11/2021 11:12 AM          XR CHEST PA OR AP 1 VIEW   Final Result   1.  Essentially stable bilateral pulmonary infiltrates.   2.  Bilateral chest  tubes in place with development of a left-sided   pneumothorax.  These findings were discussed with Dr. Tucker by telephone   12/11/2021 at 7:02 AM.      Electronically Signed by: ERIK ALFRED M.D.    Signed on: 12/11/2021 7:03 AM          XR CHEST PA OR AP 1 VIEW   Final Result      1.  Stable positioning of bilateral chest tubes with stable trace left   apical pneumothorax.  No right pneumothorax.   2.  Stable interstitial opacities throughout both lungs.         Electronically Signed by: GLENN SOMMER M.D.    Signed on: 12/10/2021 11:38 AM          XR CHEST PA OR AP 1 VIEW   Final Result      1.  Improving bilateral interstitial opacities since the previous exam.   2.  Stable positioning of the bilateral chest tubes with resolved right   apical pneumothorax and trace residual left apical pneumothorax.         Electronically Signed by: GLENN SOMMER M.D.    Signed on: 12/10/2021 7:39 AM          XR CHEST PA OR AP 1 VIEW   Final Result   1.  Bilateral chest tubes with small residual pneumothoraces.   2.  Diffuse airspace pneumonia.      Electronically Signed by: AURELIO GUSMAN M.D.    Signed on: 12/9/2021 5:40 PM          XR CHEST PA OR AP 1 VIEW   Final Result   1.    New left chest tube.  Size decreased left pneumothorax.  Interval   pleural separation is now 7 mm.    2.    No change to the small left pneumothorax.   3.    Bilateral pulmonary opacities similar to recent exam.   4.    Redemonstrated pneumomediastinum and subcutaneous emphysema.      Electronically Signed by: DINORA SCHREIBER D.O.    Signed on: 12/9/2021 8:22 AM          XR CHEST PA OR AP 1 VIEW   Final Result   1.    Diffuse bilateral mixed airspace opacities increased from prior exam.   2.   Pneumomediastinum and subcutaneous emphysema is new-increased.   3.   New small bilateral pneumothoraces compared to 12/08/2021.      Comment: ICU nurse Marylin notified by telephone 12/09/2021 at 0709 hours.      Electronically Signed by: DINORA  AUDRA SCHREIBER    Signed on: 12/9/2021 7:09 AM          XR CHEST PA OR AP 1 VIEW   Final Result   FINDINGS AND IMPRESSION:   Frontal view of the chest was obtained.        Low lung volumes limits evaluation.  Previously seen bilateral   pneumothoraces appear to have resolved.  No definite pneumothorax   identified.  Trace pneumomediastinum is identified which appears decreased   compared to prior examination.      The cardiomediastinal silhouette is stable in appearance.        Redemonstration of diffuse bilateral pulmonary infiltrates.  No large   volume pleural effusion.      Electronically Signed by: YESENIA JOSHUA M.D.    Signed on: 12/8/2021 8:46 AM          XR CHEST PA OR AP 1 VIEW   Final Result   1.    Bilateral pneumothorax slightly decreased from earlier study.   2.    Pneumomediastinum stable to slightly decreased.   3.    Bilateral pulmonary airspace infiltrates unchanged.      Electronically Signed by: DARYA TENORIO M.D.    Signed on: 12/7/2021 11:55 AM          XR CHEST PA OR AP 1 VIEW   Final Result   1.    New moderate left and small right apical pneumothorax and   pneumomediastinum.   2.    Bilateral pulmonary airspace infiltrates without significant change.       Findings notified to ICU physician at 0805.      Electronically Signed by: DARYA TENORIO M.D.    Signed on: 12/7/2021 8:07 AM          XR CHEST PA OR AP 1 VIEW   Final Result   Progression and worsening of diffuse bilateral airspace   parenchymal pneumonia.      Electronically Signed by: AURELIO GUSMAN M.D.    Signed on: 12/5/2021 9:41 AM          XR CHEST PA OR AP 1 VIEW   Final Result   1.    Persistent bilateral pulmonary opacities increased left lower lobe   retrocardiac.      Electronically Signed by: DINORA SCHREIBER D.O.    Signed on: 12/3/2021 8:25 PM          [facility] EXTREMITY LOWER VENOUS DUPLEX   Final Result   1. No evidence of deep venous thrombosis in the visualized veins of the   lower extremities bilaterally.      Electronically  Signed by: ERIK ALFRED M.D.    Signed on: 12/2/2021 12:12 PM          XR CHEST PA OR AP 1 VIEW   Final Result      No significant change in bilateral interstitial airspace opacities which   are consistent with atypical/viral pneumonia in the setting of Covid 19   infection.         Electronically Signed by: GLENN SOMMER M.D.    Signed on: 11/30/2021 7:49 AM          CTA CHEST PULMONARY EMBOLISM W CONTRAST   Final Result   1.   No CT evidence of pulmonary embolus.    2.   Extensive diffuse bilateral pulmonary infiltrates.     3.    Hepatosplenomegaly with marked diffuse fatty infiltration of the   liver noted.   4.   Cholelithiasis.      Electronically Signed by: ERIK ALFRED M.D.    Signed on: 11/28/2021 5:20 PM          XR CHEST PA OR AP 1 VIEW   Final Result   1.  Bilateral pulmonary infiltrates.      Electronically Signed by: ERIK ALFRED M.D.    Signed on: 11/28/2021 3:21 PM          XR CHEST PA OR AP 1 VIEW    (Results Pending)       ASSESSMENT and PLAN   Acute hypoxic respiratory failure, likely due to COVID-19 pneumonia, bilateral pneumothoraces, and recurrent, prolonged left pneuothroax  S/p bilateral chest tube insertion on 12/9   S/p right chest tube removed 12/17  S/p re insertion of left chest tube 12/25  S/P removal of left chest tube 12/28  S/P re insertion of left chest tube 12/29  S/p insertion of second chest tube to left chest 1/2  S/p (1) left chest tube removed 1/3  - S/P RRT x2 called on 12/5 d/t desaturation on BiPAP; tx out of ICU on 12/20  - Oxygenating on Optiflow 70% FiO2 at 60 L - wean as able   - CXR (1/6) showed left chest tube in place with tiny left apical pneumothorax improved and bilateral pulmonary infiltrates without significant change.   - TTE (1/3) noted LVEF 60%  - Completed remdesivir 5-day course. Recieved Actemra x1 (on 12/1)  - Steroid tx w/ PO Prednisone-Taper as able   - Viral PPx w/ acyclovir  - Respiratory support with PRN albuterol. Encourage IS usage   -  YESSENIA BUI ; 03/10/2021 ; [facility] PreAdmits Supplementation with vitamin C, vitamin D, and zincate   - Left chest tube in place. Switch to water seal today. CXR in am  - May require CVTS evaluation if fails to improve. Patient with recurrent pneumothoraces   - Maintaining contact/droplet isolation   - CCM, Pulmonology, and ID following      Hyperglycemia (resolved) now Hypoglycemia in patient with uncontrolled type II diabetes mellitus  - Pt is noncompliant w/ medications  - BG's in 60-150s today-A1c 10.3 noted this admission   - Continue Lantus 15 units nightly and cover with SSI. Discontinued schedule Humalog  - Monitor via Accu-Cheks  - DM educator on consult.     Acute severe protein calorie malnutrition  - AEB <50% of estimated requirements for >/= to 7 days and wt loss >5%/month  - Diet: senior living w/ Ensure Enlive TID  - Adult nutrition on consult      Anemia, likely of inflammatory block  -Hgb 11.2-->11.4 (1/5) w/ no overt bleeding      Elevated D-dimer, possibly reactive to above, ruled out PE  - D-dimer 2.37 --> 1.19 (12/20)     Sepsis, present on admission, 2/2 above  - Tachycardia, tachypnea (resolved), hypotension (improving), and leukocytosis (resolved)  - TTE ordered d/t persistent tachycardia  - Further mgmt as above   - Monitoring via telemetry     Transaminitis, likely reactive to above  -AST 54, , and alk phos 140 on (1/5)  -Monitor    Thrombocytosis, likely reactive to above  -Plt 478-->451 (1/5), Monitor     Pseudohyponatremia - resolved  Hyperphosphatemia, likely reactive to above - resolved    Hypermagnesemia, likely 2/2 above-resolved      Hyperlipidemia - No outatient statin therapy listed     DVT PPx: Lovenox, SCDs    Disposition: Pending further improvement in clinical condition, timing still remains unclear.     Discussed with Dr. Emeli Briceno, CNP  Best [facility] Inpatient [facility]

## 2024-04-30 PROBLEM — Z78.9 OTHER SPECIFIED HEALTH STATUS: Chronic | Status: ACTIVE | Noted: 2020-12-11

## 2024-12-06 NOTE — ED PROVIDER NOTE - ENMT NEGATIVE STATEMENT, MLM
Left message seeing how she and her son are feeling. Labs reviewed with Dr Mccracken. No return call from her.    Ears: no ear pain and no hearing problems. Nose: no nasal congestion and no nasal drainage. Mouth/Throat: no dysphagia, no hoarseness and no throat pain. Neck: no lumps, no pain, no stiffness and no swollen glands.